# Patient Record
(demographics unavailable — no encounter records)

---

## 2025-05-20 NOTE — ASSESSMENT
Called pt and reviewed Dr. Gamble's recommendations.  Order for chest xray placed and patient transferred to scheduling to get this scheduled.    Pt verbalized understanding of instructions and will call the clinic with any questions.    MIGEL Doyle               [FreeTextEntry1] : 1. pressure sensation in r ear possibly d/t sinusitis vs etd -type A tymps AU -results reviewed with patient -CT sinus -RTC to discuss CT sinus

## 2025-05-20 NOTE — END OF VISIT
[FreeTextEntry3] :  I, Dr. Yates, personally performed the evaluation and management (E/M) services for this new patient.  That E/M includes conducting the initial examination, assessing all conditions, and establishing the plan of care.  Today, my physician assistant, Cooper Dean, was here to observe my evaluation and management services for this patient to be followed going forward.

## 2025-05-20 NOTE — PROCEDURE
[Anterior rhinoscopy insufficient to account for symptoms] : anterior rhinoscopy insufficient to account for symptoms [Flexible Endoscope] : examined with the flexible endoscope [Deviated to the Lt] : deviated to the left [Nasal Septum] : no lesions [Nasal Septum Mucosa Bleeding] : no bleeding [Normal] : the nasopharynx was normal

## 2025-05-20 NOTE — PHYSICAL EXAM
[Nasal Endoscopy Performed] : nasal endoscopy was performed, see procedure section for findings [] : septum deviated to the left [Midline] : trachea located in midline position [Normal] : assessment of respiratory effort is normal

## 2025-05-20 NOTE — HISTORY OF PRESENT ILLNESS
[de-identified] : 35 y/o M p/w r ear pressure and r cheek intermittent over the past several years. Reports r nasal congestion, difficulty popping r ear, muffled hearing to r ear. Has used flonase for several months with no relief. Underwent allergy testing several years ago which was negative. States that he was seen by optometrist and was told that he has fluid buildup in R eye. Denies recent URI, recent sinusitis, recent airplane travel. States that he underwent CT maxillofacial in the past (does not have report or imaging with him).